# Patient Record
Sex: FEMALE | Race: WHITE | Employment: STUDENT | URBAN - METROPOLITAN AREA
[De-identification: names, ages, dates, MRNs, and addresses within clinical notes are randomized per-mention and may not be internally consistent; named-entity substitution may affect disease eponyms.]

---
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
## 2025-06-10 ENCOUNTER — OFFICE VISIT (OUTPATIENT)
Facility: LOCATION | Age: 25
End: 2025-06-10
Payer: MEDICARE

## 2025-06-10 DIAGNOSIS — N13.30 HYDROURETERONEPHROSIS: ICD-10-CM

## 2025-06-10 DIAGNOSIS — N20.1 URETERAL STONE: ICD-10-CM

## 2025-06-10 DIAGNOSIS — R82.90 URINE FINDING: Primary | ICD-10-CM

## 2025-06-10 LAB
APPEARANCE: CLEAR
BILIRUBIN: NEGATIVE
GLUCOSE (URINE DIPSTICK): NEGATIVE MG/DL
KETONES (URINE DIPSTICK): NEGATIVE MG/DL
LEUKOCYTES: NEGATIVE
MULTISTIX LOT#: NORMAL NUMERIC
NITRITE, URINE: NEGATIVE
OCCULT BLOOD: NEGATIVE
PH, URINE: 6 (ref 4.5–8)
PROTEIN (URINE DIPSTICK): NEGATIVE MG/DL
SPECIFIC GRAVITY: 1.01 (ref 1–1.03)
URINE-COLOR: YELLOW
UROBILINOGEN,SEMI-QN: 0.2 MG/DL (ref 0–1.9)

## 2025-06-10 PROCEDURE — 99203 OFFICE O/P NEW LOW 30 MIN: CPT

## 2025-06-10 PROCEDURE — 81003 URINALYSIS AUTO W/O SCOPE: CPT

## 2025-06-10 NOTE — PROGRESS NOTES
HPI:     Nacho Segura is a 24 year old female with no significant PMHx who presents to the office today for hydronephrosis consultation.    PCP: none on file    C/o: hydronephrosis  Approx onset: early Nov 2024  Description of issue(s):   -- was 27 wks pregnant, presented to [facility] ER for c/o low back and lower abdominal pain on 11/01/24. US imaging found moderate bilateral hydronephrosis likely d/t compression from gravid uterus. Pt states that she was given 2 shots, not sure what the names were or reason for the shots and was discharge home. No interventions done at that time regarding the hydronephrosis  -- gave birth in Dec 2024  -- pt presented to the ER on 05/22/25 for Left flank/LLQ pain. CT imaging showed 1mm obstructing stone within distal Left ureter at level of Left UVJ with moderate to severe Left-sided hydroureteronephrosis. Pt was discharged home with plans for expectant mgmt of left UVJ stone but pt was advised to f/u w/urology for repeated findings of hydronephrosis  -- no scans of abdomen/pelvis done between 11/01/24 and 05/22/25 to re-evaluate hydronephrosis post-partum.  -- since ER visit, pt passed her 1mm distal Left ureteral stone; currently asymptomatic  -- does report from occasional sensations of incomplete bladder emptying; double-voiding helps with fully emptying bladder when this occurs.   -- also occasionally has a pinching sensation in urethra with urination, \"like there is a needle\".   -- drinks 4x 30oz water bottles/day, since ER visit stopped drinking anything other than water.     Denies: gross hematuria, persistent dysuria, abdominal/flank/suprapubic pain, urinary urgency    Fhx of stones (sister)  No Fhx of  malignancy     UA today neg blood, leuks, nitrites    No prior abdominal/pelvis imaging, relevant labs seen in [facility] Everywhere --> patient showed imaging reports on her phone during visit.   (05/22/25) CT A/P: punctate 1mm obstructing stones noted in distal  Left ureter at level of Left UVJ causing a moderate to severe Left-sided hydroureteronephrosis. No solid or cystic lesion. Normal cortical medullary differentiation. Bladder incompletely distended, otherwise unremarkable.   (11/01/24) US Renal: moderate hydronephrosis bilaterally. Prevoid 248mL. PVR 5mL. (At [facility])    HISTORY:  Past Medical History[1]   Past Surgical History[2]   Family History[3]   Social History: Short Social Hx on File[4]     Medications (Active prior to today's visit):  Current Medications[5]    Allergies:  Allergies[6]    ROS:     A comprehensive 10 point review of systems was completed.  Pertinent positives and negatives noted in the the HPI.    PHYSICAL EXAM:     GENERAL APPEARANCE: well, developed, well nourished, in no acute distress  NEUROLOGIC: nonfocal, alert and oriented  HEAD: normocephalic, atraumatic  EYES: sclera non-icteric  EARS: hearing intact  ORAL CAVITY: mucosa moist  NECK/THYROID: no obvious goiter or masses  LUNGS: nonlabored breathing  ABDOMEN: soft, no obvious masses or tenderness. No CVAT.   SKIN: no obvious rashes     ASSESSMENT/PLAN:   Hydronephrosis  Kidney stones  Hydronephrosis 1st found during US scan in [place] ER Nov 2024; at the time, pt was 27 wks pregnant, hydronephrosis was moderate bilaterally, likely d/t compression of ureters from gravid uterus. Gave birth Dec 2024. No repeat imaging of A/P to verify if hydronephrosis resolved. Then, when pt presented to ER on 05/22/25 for flank pain, was found to have unilateral moderate to severe hydroureteronephrosis d/t 1mm Left UVJ stone. Pt states that she has since passed her stone and is asymptomatic.   - will order CTU to verify distal Left ureteral stone has passed and to re-evaluate for any hydronephrosis; want to rule out any additional potential causes for hydronephrosis. Further workup will depend on results  - reviewed stone prevention (drinking at least 48-64 oz water daily, <2300mg sodium daily,  and increase citrate intake)  - ER precautions reviewed  - asked pt to reach out to [facility] for copy of imaging results; can drop off CD/reports to our office at her convenience.       SOFÍA Ham  Department of Urology  [facility]     I have spent 33 min total time on the day of the encounter, including: review of chart including lab and imaging studies, obtaining and/or reviewing separately obtained history, performing a medically appropriate examination and/or evaluation, counseling and educating the patient/family/caregiver, ordering medications/tests/procedures, and documenting clinical information in Epic.       [1] No past medical history on file.  [2] No past surgical history on file.  [3] No family history on file.  [4]   Social History  Socioeconomic History    Marital status: Single   [5]   No current outpatient medications on file.   [6] No Known Allergies

## 2025-06-20 ENCOUNTER — HOSPITAL ENCOUNTER (OUTPATIENT)
Dept: CT IMAGING | Age: 25
Discharge: HOME | End: 2025-06-20
Payer: MEDICAID

## 2025-06-20 DIAGNOSIS — N13.30 HYDROURETERONEPHROSIS: ICD-10-CM

## 2025-06-20 DIAGNOSIS — N20.1 URETERAL STONE: ICD-10-CM

## 2025-06-20 LAB
CREAT BLD-MCNC: 0.7 MG/DL (ref 0.55–1.02)
EGFRCR SERPLBLD CKD-EPI 2021: 125 ML/MIN/1.73M2 (ref 60–?)

## 2025-06-20 PROCEDURE — 82565 ASSAY OF CREATININE: CPT

## 2025-06-20 PROCEDURE — 74178 CT ABD&PLV WO CNTR FLWD CNTR: CPT

## 2025-06-25 ENCOUNTER — TELEPHONE (OUTPATIENT)
Facility: LOCATION | Age: 25
End: 2025-06-25

## 2025-06-26 NOTE — TELEPHONE ENCOUNTER
RN Phoned Patient to discuss below.  No answer, no voice message set up to leave message.  RN Will attempt again this afternoon.

## 2025-06-26 NOTE — TELEPHONE ENCOUNTER
Phoned Patient to discuss below.  No answer, unable to leave voice message.  MCM sent  This Encounter is now closed.

## (undated) NOTE — ED AVS SNAPSHOT
Urbano Sloan   MRN: [id number]    Department:  THE [facility] Emergency Department in [place]   Date of Visit:  11/11/2018           Disclosure     Insurance plans vary and the physician(s) referred by the ER may not be covered by your plan.  Please contact tell this physician (or your personal doctor if your instructions are to return to your personal doctor) about any new or lasting problems. The primary care or specialist physician will see patients referred from the [facility] Emergency Department.  Pablo Mabry